# Patient Record
Sex: FEMALE | Race: BLACK OR AFRICAN AMERICAN | Employment: UNEMPLOYED | URBAN - METROPOLITAN AREA
[De-identification: names, ages, dates, MRNs, and addresses within clinical notes are randomized per-mention and may not be internally consistent; named-entity substitution may affect disease eponyms.]

---

## 2017-03-08 ENCOUNTER — HOSPITAL ENCOUNTER (OUTPATIENT)
Dept: MAMMOGRAPHY | Age: 52
Discharge: HOME | End: 2017-03-08
Attending: RADIOLOGY
Payer: COMMERCIAL

## 2017-03-08 DIAGNOSIS — Z85.3 HX: BREAST CANCER: ICD-10-CM

## 2017-03-08 PROCEDURE — 77065 DX MAMMO INCL CAD UNI: CPT

## 2017-04-17 ENCOUNTER — OFFICE VISIT (OUTPATIENT)
Dept: SURGERY | Age: 52
End: 2017-04-17

## 2017-04-17 VITALS
HEART RATE: 78 BPM | WEIGHT: 148 LBS | SYSTOLIC BLOOD PRESSURE: 100 MMHG | DIASTOLIC BLOOD PRESSURE: 66 MMHG | HEIGHT: 65 IN | RESPIRATION RATE: 18 BRPM | BODY MASS INDEX: 24.66 KG/M2

## 2017-04-17 DIAGNOSIS — C50.412 BREAST CANCER OF UPPER-OUTER QUADRANT OF LEFT FEMALE BREAST (HCC): Primary | ICD-10-CM

## 2017-04-17 DIAGNOSIS — Z98.890 S/P LUMPECTOMY OF BREAST: ICD-10-CM

## 2017-04-17 DIAGNOSIS — Z80.3 FAMILY HISTORY OF BREAST CANCER: ICD-10-CM

## 2017-04-17 DIAGNOSIS — Z92.3 S/P RADIATION THERAPY: ICD-10-CM

## 2017-04-17 NOTE — PATIENT INSTRUCTIONS
Breast Self-Exam: Care Instructions  Your Care Instructions  A breast self-exam is when you check your breasts for lumps or changes. This regular exam helps you learn how your breasts normally look and feel. Most breast problems or changes are not because of cancer. Breast self-exam is not a substitute for a mammogram. Having regular breast exams by your doctor and regular mammograms improve your chances of finding any problems with your breasts. Some women set a time each month to do a step-by-step breast self-exam. Other women like a less formal system. They might look at their breasts as they brush their teeth, or feel their breasts once in a while in the shower. If you notice a change in your breast, tell your doctor. Follow-up care is a key part of your treatment and safety. Be sure to make and go to all appointments, and call your doctor if you are having problems. Its also a good idea to know your test results and keep a list of the medicines you take. How do you do a breast self-exam?  · The best time to examine your breasts is usually one week after your menstrual period begins. Your breasts should not be tender then. If you do not have periods, you might do your exam on a day of the month that is easy to remember. · To examine your breasts:  ¨ Remove all your clothes above the waist and lie down. When you are lying down, your breast tissue spreads evenly over your chest wall, which makes it easier to feel all your breast tissue. ¨ Use the pads--not the fingertips--of the 3 middle fingers of your left hand to check your right breast. Move your fingers slowly in small coin-sized circles that overlap. ¨ Use three levels of pressure to feel of all your breast tissue. Use light pressure to feel the tissue close to the skin surface. Use medium pressure to feel a little deeper. Use firm pressure to feel your tissue close to your breastbone and ribs.  Use each pressure level to feel your breast tissue before moving on to the next spot. ¨ Check your entire breast, moving up and down as if following a strip from the collarbone to the bra line, and from the armpit to the ribs. Repeat until you have covered the entire breast.  ¨ Repeat this procedure for your left breast, using the pads of the 3 middle fingers of your right hand. · To examine your breasts while in the shower:  ¨ Place one arm over your head and lightly soap your breast on that side. ¨ Using the pads of your fingers, gently move your hand over your breast (in the strip pattern described above), feeling carefully for any lumps or changes. ¨ Repeat for the other breast.  · Have your doctor inspect anything you notice to see if you need further testing. Where can you learn more? Go to http://asa-wendy.info/. Enter P148 in the search box to learn more about \"Breast Self-Exam: Care Instructions. \"  Current as of: July 26, 2016  Content Version: 11.2  © 4734-5015 [employer]. Care instructions adapted under license by [employer] (which disclaims liability or warranty for this information). If you have questions about a medical condition or this instruction, always ask your healthcare professional. [place] [address] any warranty or liability for your use of this information.

## 2017-04-17 NOTE — PROGRESS NOTES
HISTORY OF PRESENT ILLNESS  Mathieu Horton is a 54 y.o. female. HPI  Established patient of Dr. Elissa Ennis presents today for follow-up to LEFT breast cancer. Patient reports she is doing well. No breast complaints and denies pain at this time. History of breast cancer-  LEFT breast IDC triple negative  Completed neoadjuvant chemotherapy. Followed by Dr. Juno Crump. 2/4/16- LEFT breast lumpectomy and LEFT SLNB    LEFT IDC 1.5 cm, margins clear, ER/ID negative, HER2 unamp. , 6 SLN - negative. [id number]  4/27/2016- completed radiation. Followed by Dr. Lazaro Ivan. 6/3/16 - port removed    OB History     Obstetric Comments    Menarche:  17. LMP: ? .  # of Children:  2. Age at Delivery of First Child:  24.   Hysterectomy/oophorectomy:  YES/NO. Breast Bx:  yes. Hx of Breast Feeding:  no. BCP:  yes. Hormone therapy:  no.               Past Surgical History:   Procedure Laterality Date    HX BREAST BIOPSY      HX BREAST LUMPECTOMY Left 2/4/2016    [facility] BRE[facility], [facility] [address]    HX HYSTERECTOMY  2006    CORNELIUS BIOPSY BREAST STEREOTACTIC Left 02/2016     FH includes-  Mother was diagnosed with breast cancer at age 39.    Maternal aunt was diagnosed with breast cancer (unsure what age). Maternal cousin was diagnosed with breast cancer at age 36.    Patient had genetic testing- BreastNEXT negative. 3/8/2017 - LEFT diagnostic - BIRADS 2  9/15/16 - BILATERAL diagnostic - BIRADS 2  ROS    Physical Exam   Constitutional: She is oriented to person, place, and time. She appears well-developed and well-nourished. Pulmonary/Chest: Right breast exhibits no inverted nipple, no mass, no nipple discharge, no skin change and no tenderness. Left breast exhibits skin change (well healed surgical scar with associated scar tissue). Left breast exhibits no inverted nipple, no mass, no nipple discharge and no tenderness.  Breasts are symmetrical.   Musculoskeletal: Normal range of motion. UE x 2   Lymphadenopathy:     She has no cervical adenopathy. She has no axillary adenopathy. Right: No supraclavicular adenopathy present. Left: No supraclavicular adenopathy present. Neurological: She is alert and oriented to person, place, and time. Skin: Skin is warm, dry and intact. Chest and breasts examined   Psychiatric: She has a normal mood and affect. Her speech is normal and behavior is normal.     Visit Vitals    /66    Pulse 78    Resp 18    LMP  (LMP Unknown)     ASSESSMENT and PLAN  Encounter Diagnoses   Name Primary?  Breast cancer of upper-outer quadrant of left female breast ([facility] [address] Yes    S/P lumpectomy of breast     S/P radiation therapy     Family history of breast cancer    Normal exam and imaging with no evidence of local recurrence. We discussed her diagnosis, treatment and ongoing management of her breast health with SBE, CBE and imaging. She will plan to have a BDmammogram and RTC here in 6 months. She will see Dr. Broderick Chambers in the interim. She does not have any additional follow-up with Dr. Perry Shukla at this time. She is comfortable with this plan. All questions answered and she stated understanding.

## 2017-10-05 ENCOUNTER — OFFICE VISIT (OUTPATIENT)
Dept: SURGERY | Age: 52
End: 2017-10-05

## 2017-10-05 ENCOUNTER — HOSPITAL ENCOUNTER (OUTPATIENT)
Dept: MAMMOGRAPHY | Age: 52
Discharge: HOME | End: 2017-10-05
Attending: SURGERY
Payer: COMMERCIAL

## 2017-10-05 VITALS
WEIGHT: 152 LBS | SYSTOLIC BLOOD PRESSURE: 103 MMHG | BODY MASS INDEX: 25.33 KG/M2 | HEART RATE: 66 BPM | HEIGHT: 65 IN | DIASTOLIC BLOOD PRESSURE: 68 MMHG

## 2017-10-05 DIAGNOSIS — C50.412 MALIGNANT NEOPLASM OF UPPER-OUTER QUADRANT OF LEFT BREAST IN FEMALE, ESTROGEN RECEPTOR NEGATIVE (HCC): Primary | ICD-10-CM

## 2017-10-05 DIAGNOSIS — Z98.890 S/P LUMPECTOMY OF BREAST: ICD-10-CM

## 2017-10-05 DIAGNOSIS — Z92.3 S/P RADIATION THERAPY: ICD-10-CM

## 2017-10-05 DIAGNOSIS — Z17.1 MALIGNANT NEOPLASM OF UPPER-OUTER QUADRANT OF LEFT BREAST IN FEMALE, ESTROGEN RECEPTOR NEGATIVE (HCC): Primary | ICD-10-CM

## 2017-10-05 DIAGNOSIS — C50.919 BREAST CANCER IN FEMALE (HCC): ICD-10-CM

## 2017-10-05 PROCEDURE — 77066 DX MAMMO INCL CAD BI: CPT

## 2017-10-05 NOTE — PATIENT INSTRUCTIONS
Breast Self-Exam: Care Instructions  Your Care Instructions  A breast self-exam is when you check your breasts for lumps or changes. This regular exam helps you learn how your breasts normally look and feel. Most breast problems or changes are not because of cancer. Breast self-exam is not a substitute for a mammogram. Having regular breast exams by your doctor and regular mammograms improve your chances of finding any problems with your breasts. Some women set a time each month to do a step-by-step breast self-exam. Other women like a less formal system. They might look at their breasts as they brush their teeth, or feel their breasts once in a while in the shower. If you notice a change in your breast, tell your doctor. Follow-up care is a key part of your treatment and safety. Be sure to make and go to all appointments, and call your doctor if you are having problems. Its also a good idea to know your test results and keep a list of the medicines you take. How do you do a breast self-exam?  · The best time to examine your breasts is usually one week after your menstrual period begins. Your breasts should not be tender then. If you do not have periods, you might do your exam on a day of the month that is easy to remember. · To examine your breasts:  ¨ Remove all your clothes above the waist and lie down. When you are lying down, your breast tissue spreads evenly over your chest wall, which makes it easier to feel all your breast tissue. ¨ Use the pads--not the fingertips--of the 3 middle fingers of your left hand to check your right breast. Move your fingers slowly in small coin-sized circles that overlap. ¨ Use three levels of pressure to feel of all your breast tissue. Use light pressure to feel the tissue close to the skin surface. Use medium pressure to feel a little deeper. Use firm pressure to feel your tissue close to your breastbone and ribs.  Use each pressure level to feel your breast tissue before moving on to the next spot. ¨ Check your entire breast, moving up and down as if following a strip from the collarbone to the bra line, and from the armpit to the ribs. Repeat until you have covered the entire breast.  ¨ Repeat this procedure for your left breast, using the pads of the 3 middle fingers of your right hand. · To examine your breasts while in the shower:  ¨ Place one arm over your head and lightly soap your breast on that side. ¨ Using the pads of your fingers, gently move your hand over your breast (in the strip pattern described above), feeling carefully for any lumps or changes. ¨ Repeat for the other breast.  · Have your doctor inspect anything you notice to see if you need further testing. Where can you learn more? Go to http://asa-wendy.info/. Enter P148 in the search box to learn more about \"Breast Self-Exam: Care Instructions. \"  Current as of: July 26, 2016  Content Version: 11.3  © 2286-9931 [employer]. Care instructions adapted under license by [employer] (which disclaims liability or warranty for this information). If you have questions about a medical condition or this instruction, always ask your healthcare professional. [place] [address] any warranty or liability for your use of this information.

## 2017-10-05 NOTE — PROGRESS NOTES
HISTORY OF PRESENT ILLNESS  Esdras Rowell is a 50 y.o. female. Breast Cancer     Established patient of Dr. Jackson Cousins presents today for follow-up to LEFT breast cancer. Patient reports she is doing well. No breast complaints and denies pain at this time.       History of breast cancer-  LEFT breast IDC triple negative  Completed neoadjuvant chemotherapy. Followed by Dr. Selin Hale. 2/4/16- LEFT breast lumpectomy and LEFT SLNB    LEFT IDC 1.5 cm, margins clear, ER/VT negative, HER2 unamp. , 6 SLN - negative. [id number]  4/27/2016- completed radiation. Followed by Dr. Татьяна Jauregui. 6/3/16 - port removed    OB History     Obstetric Comments    Menarche:  17. LMP: ? .  # of Children:  2. Age at Delivery of First Child:  24.   Hysterectomy/oophorectomy:  YES/NO. Breast Bx:  yes. Hx of Breast Feeding:  no. BCP:  yes. Hormone therapy:  no.               Past Surgical History:   Procedure Laterality Date    HX BREAST BIOPSY      HX BREAST LUMPECTOMY Left 2/4/2016    LEFT BREAST LUMPECTOMY, LEFT NEEDLE LOCALIZATION performed by Negrita Proctor MD [facility] [address] [facility] [place]  [facility]    HX HYSTERECTOMY  2006    CORNELIUS BIOPSY BREAST STEREOTACTIC Left 02/2016     FH includes-  Mother was diagnosed with breast cancer at age 39.    Maternal aunt was diagnosed with breast cancer (unsure what age). Maternal cousin was diagnosed with breast cancer at age 36.    Patient had genetic testing- BreastNEXT negative. Recent imaging-  Bilateral diagnostic mammogram today, 10/5/17- BIRADS 2  IMPRESSION:  1. BIRADS: 2, BENIGN. 2. No mammographic evidence for malignancy. 3. Annual bilateral diagnostic mammogram followup is advised. 4. The patient is informed. ROS    Physical Exam   Constitutional: She appears well-developed and well-nourished. Pulmonary/Chest: Right breast exhibits no inverted nipple, no mass, no nipple discharge, no skin change and no tenderness.  Left breast exhibits no inverted nipple, no mass, no nipple discharge, no skin change and no tenderness. Breasts are symmetrical.       Musculoskeletal: Normal range of motion. UE x 2   Lymphadenopathy:     She has no cervical adenopathy. She has no axillary adenopathy. Right: No supraclavicular adenopathy present. Left: No supraclavicular adenopathy present. Skin: Skin is warm, dry and intact. Chest and breasts examined   Psychiatric: She has a normal mood and affect. Her speech is normal and behavior is normal.     Visit Vitals    /68    Pulse 66    Ht 5' 5\" (1.651 m)    Wt 152 lb (68.9 kg)    LMP  (LMP Unknown)    BMI 25.29 kg/m2     ASSESSMENT and PLAN  Encounter Diagnoses   Name Primary?  Malignant neoplasm of upper-outer quadrant of left breast in female, estrogen receptor negative ([facility] [address] Yes    S/P lumpectomy of breast     S/P radiation therapy      Normal exam and imaging with no evidence of local recurrence. She has follow-up with Dr. Hetal Silver in January and will RTC here in 6 months. She will follow-up with derm if she notices changes to the right breast mole.     Anticipate BDmammogram in 10/2018

## 2017-10-05 NOTE — PROGRESS NOTES
HISTORY OF PRESENT ILLNESS  Cande Khan is a 50 y.o. female. HPI   Established patient of Dr. Dejan Harp presents today for follow-up to LEFT breast cancer. Patient reports she is doing well. No breast complaints and denies pain at this time.       History of breast cancer-  LEFT breast IDC triple negative  Completed neoadjuvant chemotherapy. Followed by Dr. Valentino Kung. 2/4/16- LEFT breast lumpectomy and LEFT SLNB    LEFT IDC 1.5 cm, margins clear, ER/OK negative, HER2 unamp. , 6 SLN - negative. [id number]  4/27/2016- completed radiation. Followed by Dr. Evelyn Barker. 6/3/16 - port removed    FH includes-  Mother was diagnosed with breast cancer at age 39.    Maternal aunt was diagnosed with breast cancer (unsure what age). Maternal cousin was diagnosed with breast cancer at age 36.    Patient had genetic testing- BreastNEXT negative. Recent imaging-  Bilateral diagnostic mammogram today, 10/5/17- BIRADS 2  IMPRESSION:  1. BIRADS: 2, BENIGN. 2. No mammographic evidence for malignancy. 3. Annual bilateral diagnostic mammogram followup is advised. 4. The patient is informed.     ROS    Physical Exam    ASSESSMENT and PLAN  {ASSESSMENT/PLAN:60968}

## 2018-04-05 ENCOUNTER — OFFICE VISIT (OUTPATIENT)
Dept: SURGERY | Age: 53
End: 2018-04-05

## 2018-04-05 VITALS
WEIGHT: 144 LBS | HEIGHT: 65 IN | BODY MASS INDEX: 23.99 KG/M2 | DIASTOLIC BLOOD PRESSURE: 80 MMHG | SYSTOLIC BLOOD PRESSURE: 111 MMHG | HEART RATE: 73 BPM

## 2018-04-05 DIAGNOSIS — Z92.3 S/P RADIATION THERAPY: ICD-10-CM

## 2018-04-05 DIAGNOSIS — C50.412 MALIGNANT NEOPLASM OF UPPER-OUTER QUADRANT OF LEFT BREAST IN FEMALE, ESTROGEN RECEPTOR NEGATIVE (HCC): Primary | ICD-10-CM

## 2018-04-05 DIAGNOSIS — Z17.1 MALIGNANT NEOPLASM OF UPPER-OUTER QUADRANT OF LEFT BREAST IN FEMALE, ESTROGEN RECEPTOR NEGATIVE (HCC): Primary | ICD-10-CM

## 2018-04-05 DIAGNOSIS — Z98.890 S/P LUMPECTOMY OF BREAST: ICD-10-CM

## 2018-04-05 RX ORDER — ERGOCALCIFEROL 1.25 MG/1
CAPSULE ORAL
Refills: 0 | COMMUNITY
Start: 2018-03-11 | End: 2019-12-30 | Stop reason: ALTCHOICE

## 2018-04-05 RX ORDER — CYCLOBENZAPRINE HCL 5 MG
TABLET ORAL
Refills: 0 | COMMUNITY
Start: 2018-01-10 | End: 2019-12-30 | Stop reason: SDUPTHER

## 2018-04-05 RX ORDER — DOCUSATE SODIUM 100 MG/1
CAPSULE, LIQUID FILLED ORAL
Refills: 3 | COMMUNITY
Start: 2018-03-29 | End: 2019-12-30

## 2018-04-05 NOTE — PATIENT INSTRUCTIONS
Breast Self-Exam: Care Instructions  Your Care Instructions    A breast self-exam is when you check your breasts for lumps or changes. This regular exam helps you learn how your breasts normally look and feel. Most breast problems or changes are not because of cancer. Breast self-exam is not a substitute for a mammogram. Having regular breast exams by your doctor and regular mammograms improve your chances of finding any problems with your breasts. Some women set a time each month to do a step-by-step breast self-exam. Other women like a less formal system. They might look at their breasts as they brush their teeth, or feel their breasts once in a while in the shower. If you notice a change in your breast, tell your doctor. Follow-up care is a key part of your treatment and safety. Be sure to make and go to all appointments, and call your doctor if you are having problems. It's also a good idea to know your test results and keep a list of the medicines you take. How do you do a breast self-exam?  · The best time to examine your breasts is usually one week after your menstrual period begins. Your breasts should not be tender then. If you do not have periods, you might do your exam on a day of the month that is easy to remember. · To examine your breasts:  ¨ Remove all your clothes above the waist and lie down. When you are lying down, your breast tissue spreads evenly over your chest wall, which makes it easier to feel all your breast tissue. ¨ Use the pads-not the fingertips-of the 3 middle fingers of your left hand to check your right breast. Move your fingers slowly in small coin-sized circles that overlap. ¨ Use three levels of pressure to feel of all your breast tissue. Use light pressure to feel the tissue close to the skin surface. Use medium pressure to feel a little deeper. Use firm pressure to feel your tissue close to your breastbone and ribs.  Use each pressure level to feel your breast tissue before moving on to the next spot. ¨ Check your entire breast, moving up and down as if following a strip from the collarbone to the bra line, and from the armpit to the ribs. Repeat until you have covered the entire breast.  ¨ Repeat this procedure for your left breast, using the pads of the 3 middle fingers of your right hand. · To examine your breasts while in the shower:  ¨ Place one arm over your head and lightly soap your breast on that side. ¨ Using the pads of your fingers, gently move your hand over your breast (in the strip pattern described above), feeling carefully for any lumps or changes. ¨ Repeat for the other breast.  · Have your doctor inspect anything you notice to see if you need further testing. Where can you learn more? Go to http://asa-wendy.info/. Enter P148 in the search box to learn more about \"Breast Self-Exam: Care Instructions. \"  Current as of: May 12, 2017  Content Version: 11.4  © 1149-8732 [employer]. Care instructions adapted under license by [employer] (which disclaims liability or warranty for this information). If you have questions about a medical condition or this instruction, always ask your healthcare professional. [place] [address] any warranty or liability for your use of this information.
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
## 2018-04-05 NOTE — MR AVS SNAPSHOT
[facility] [address] [place] [id number] Patient: Joseph Reddy MRN: [id number] :1965 Visit Information Date & Time Provider Department Dept. Phone Encounter #  
 2018  2:30 PM Ginny Woo  [address] [id number] Upcoming Health Maintenance Date Due Pneumococcal 19-64 Highest Risk (1 of 3 - PCV13) 1986 DTaP/Tdap/Td series (1 - Tdap) 1988 PAP AKA CERVICAL CYTOLOGY 1988 Influenza Age 5 to Adult 2017 FOBT Q 1 YEAR AGE 50-75 2017 BREAST CANCER SCRN MAMMOGRAM 10/5/2019 Allergies as of 2018  Review Complete On: 2018 By: Fernanda Lemos RN No Known Allergies Current Immunizations  Never Reviewed No immunizations on file. Not reviewed this visit You Were Diagnosed With   
  
 Codes Comments Malignant neoplasm of upper-outer quadrant of left breast in female, estrogen receptor negative ([facility] [address]    -  Primary ICD-10-CM: C50.412, Z17.1 ICD-9-CM: 174.4, V86.1 S/P lumpectomy of breast     ICD-10-CM: Z98.890 ICD-9-CM: V45.89 S/P radiation therapy     ICD-10-CM: Z92.3 ICD-9-CM: V66.1 Vitals BP Pulse Height(growth percentile) Weight(growth percentile) LMP BMI  
 111/80 73 5' 5\" (1.651 m) 144 lb (65.3 kg) (LMP Unknown) 23.96 kg/m2 OB Status Smoking Status Hysterectomy Never Smoker BMI and BSA Data Body Mass Index Body Surface Area  
 23.96 kg/m 2 1.73 m 2 Preferred Pharmacy Pharmacy Name Phone [address] [phone] Your Updated Medication List  
  
   
This list is accurate as of 18  2:50 PM.  Always use your most recent med list.  
  
  
  
  
 Biotin 2,500 mcg Cap Take 1 Tab by mouth daily. cyclobenzaprine 5 mg tablet Commonly known as:  FLEXERIL  
 TAKE 1 TABLET BY MOUTH 3 TIMES A DAY AS NEEDED  
  
 docusate sodium 100 mg capsule Commonly known as:  COLACE  
TAKE 1 CAPSULE BY MOUTH AS NEEDED TWICE A DAY  
  
 ergocalciferol 50,000 unit capsule Commonly known as:  ERGOCALCIFEROL  
TAKE ONCE A WEEK FOR 12 WEEKS THEN STOP  
  
 multivitamin tablet Commonly known as:  ONE A DAY Take 1 Tab by mouth daily. To-Do List   
 10/01/2018 Imaging:  CORN[facility] MAMMO BI DX INCL CAD Patient Instructions Breast Self-Exam: Care Instructions Your Care Instructions A breast self-exam is when you check your breasts for lumps or changes. This regular exam helps you learn how your breasts normally look and feel. Most breast problems or changes are not because of cancer. Breast self-exam is not a substitute for a mammogram. Having regular breast exams by your doctor and regular mammograms improve your chances of finding any problems with your breasts. Some women set a time each month to do a step-by-step breast self-exam. Other women like a less formal system. They might look at their breasts as they brush their teeth, or feel their breasts once in a while in the shower. If you notice a change in your breast, tell your doctor. Follow-up care is a key part of your treatment and safety. Be sure to make and go to all appointments, and call your doctor if you are having problems. It's also a good idea to know your test results and keep a list of the medicines you take. How do you do a breast self-exam? 
· The best time to examine your breasts is usually one week after your menstrual period begins. Your breasts should not be tender then. If you do not have periods, you might do your exam on a day of the month that is easy to remember. · To examine your breasts: ¨ Remove all your clothes above the waist and lie down.  When you are lying down, your breast tissue spreads evenly over your chest wall, which makes it easier to feel all your breast tissue. ¨ Use the pads-not the fingertips-of the 3 middle fingers of your left hand to check your right breast. Move your fingers slowly in small coin-sized circles that overlap. ¨ Use three levels of pressure to feel of all your breast tissue. Use light pressure to feel the tissue close to the skin surface. Use medium pressure to feel a little deeper. Use firm pressure to feel your tissue close to your breastbone and ribs. Use each pressure level to feel your breast tissue before moving on to the next spot. ¨ Check your entire breast, moving up and down as if following a strip from the collarbone to the bra line, and from the armpit to the ribs. Repeat until you have covered the entire breast. 
¨ Repeat this procedure for your left breast, using the pads of the 3 middle fingers of your right hand. · To examine your breasts while in the shower: 
¨ Place one arm over your head and lightly soap your breast on that side. ¨ Using the pads of your fingers, gently move your hand over your breast (in the strip pattern described above), feeling carefully for any lumps or changes. ¨ Repeat for the other breast. 
· Have your doctor inspect anything you notice to see if you need further testing. Where can you learn more? Go to http://asa-wendy.info/. Enter P148 in the search box to learn more about \"Breast Self-Exam: Care Instructions. \" Current as of: May 12, 2017 Content Version: 11.4 © 8053-1050 [employer]. Care instructions adapted under license by [employer] (which disclaims liability or warranty for this information). If you have questions about a medical condition or this instruction, always ask your healthcare professional. [place] [address] any warranty or liability for your use of this information. Introducing [facility] & HEALTH SERVICES! Dear Bobby Stock: Thank you for requesting a [facility] account. Our records indicate that you already have an active [facility] account. You can access your account anytime at [email or web address] Did you know that you can access your hospital and ER discharge instructions at any time in [facility]? You can also review all of your test results from your hospital stay or ER visit. Additional Information If you have questions, please visit the Frequently Asked Questions section of the [facility] website at [email or web address] Remember, [facility] is NOT to be used for urgent needs. For medical emergencies, dial 911. Now available from your iPhone and Android! Please provide this summary of care documentation to your next provider. Your primary care clinician is listed as Alejandra Bazan. If you have any questions after today's visit, please call [phone].

## 2018-10-04 ENCOUNTER — OFFICE VISIT (OUTPATIENT)
Dept: SURGERY | Age: 53
End: 2018-10-04

## 2018-10-04 ENCOUNTER — HOSPITAL ENCOUNTER (OUTPATIENT)
Dept: MAMMOGRAPHY | Age: 53
Discharge: HOME | End: 2018-10-04
Attending: NURSE PRACTITIONER
Payer: COMMERCIAL

## 2018-10-04 VITALS
HEART RATE: 70 BPM | BODY MASS INDEX: 23.99 KG/M2 | WEIGHT: 144 LBS | SYSTOLIC BLOOD PRESSURE: 100 MMHG | DIASTOLIC BLOOD PRESSURE: 69 MMHG | HEIGHT: 65 IN

## 2018-10-04 DIAGNOSIS — Z80.3 FAMILY HISTORY OF BREAST CANCER: ICD-10-CM

## 2018-10-04 DIAGNOSIS — Z92.3 S/P RADIATION THERAPY: ICD-10-CM

## 2018-10-04 DIAGNOSIS — Z98.890 S/P LUMPECTOMY OF BREAST: ICD-10-CM

## 2018-10-04 DIAGNOSIS — C50.412 MALIGNANT NEOPLASM OF UPPER-OUTER QUADRANT OF LEFT BREAST IN FEMALE, ESTROGEN RECEPTOR NEGATIVE (HCC): Primary | ICD-10-CM

## 2018-10-04 DIAGNOSIS — C50.412 MALIGNANT NEOPLASM OF UPPER-OUTER QUADRANT OF LEFT BREAST IN FEMALE, ESTROGEN RECEPTOR NEGATIVE (HCC): ICD-10-CM

## 2018-10-04 DIAGNOSIS — Z17.1 MALIGNANT NEOPLASM OF UPPER-OUTER QUADRANT OF LEFT BREAST IN FEMALE, ESTROGEN RECEPTOR NEGATIVE (HCC): ICD-10-CM

## 2018-10-04 DIAGNOSIS — Z17.1 MALIGNANT NEOPLASM OF UPPER-OUTER QUADRANT OF LEFT BREAST IN FEMALE, ESTROGEN RECEPTOR NEGATIVE (HCC): Primary | ICD-10-CM

## 2018-10-04 PROCEDURE — 77062 BREAST TOMOSYNTHESIS BI: CPT

## 2018-10-04 PROCEDURE — 77066 DX MAMMO INCL CAD BI: CPT

## 2018-10-04 NOTE — MR AVS SNAPSHOT
Landy Walters [address] 
[phone] Patient: Kee Vaz MRN: [id number] :1965 Visit Information Date & Time Provider Department Dept. Phone Encounter #  
 10/4/2018  2:30 PM Yemi Hilliard  [address] [id number] Your Appointments 10/3/2019  2:00 PM  
Follow Up with YOU Sanchez 22 ([facility]) Appt Note: 1yr follow up/cp?/aac  
 [address] [phone]  
[address] [facility] Upcoming Health Maintenance Date Due Pneumococcal 19-64 Highest Risk (1 of 3 - PCV13) 1986 DTaP/Tdap/Td series (1 - Tdap) 1988 PAP AKA CERVICAL CYTOLOGY 1988 Shingrix Vaccine Age 50> (1 of 2) 2017 FOBT Q 1 YEAR AGE 50-75 2017 Influenza Age 5 to Adult 2018 BREAST CANCER SCRN MAMMOGRAM 10/5/2019 Allergies as of 10/4/2018  Review Complete On: 10/4/2018 By: Yemi Hilliard NP No Known Allergies Current Immunizations  Never Reviewed No immunizations on file. Not reviewed this visit You Were Diagnosed With   
  
 Codes Comments Malignant neoplasm of upper-outer quadrant of left breast in female, estrogen receptor negative ([facility] [address]    -  Primary ICD-10-CM: C50.412, Z17.1 ICD-9-CM: 174.4, V86.1 S/P lumpectomy of breast     ICD-10-CM: Z98.890 ICD-9-CM: V45.89 S/P radiation therapy     ICD-10-CM: Z92.3 ICD-9-CM: V66.1 Family history of breast cancer     ICD-10-CM: Z80.3 ICD-9-CM: V16.3 Vitals BP Pulse Height(growth percentile) Weight(growth percentile) LMP BMI  
 100/69 70 5' 5\" (1.651 m) 144 lb (65.3 kg) (LMP Unknown) 23.96 kg/m2 OB Status Smoking Status Hysterectomy Never Smoker BMI and BSA Data Body Mass Index Body Surface Area  
 23.96 kg/m 2 1.73 m 2 Preferred Pharmacy Pharmacy Name Phone [address] [place] [address] [phone] Your Updated Medication List  
  
   
This list is accurate as of 10/4/18  3:32 PM.  Always use your most recent med list.  
  
  
  
  
 Biotin 2,500 mcg Cap Take 1 Tab by mouth daily. cyclobenzaprine 5 mg tablet Commonly known as:  FLEXERIL  
TAKE 1 TABLET BY MOUTH 3 TIMES A DAY AS NEEDED  
  
 docusate sodium 100 mg capsule Commonly known as:  COLACE  
TAKE 1 CAPSULE BY MOUTH AS NEEDED TWICE A DAY  
  
 ergocalciferol 50,000 unit capsule Commonly known as:  ERGOCALCIFEROL  
TAKE ONCE A WEEK FOR 12 WEEKS THEN STOP  
  
 multivitamin tablet Commonly known as:  ONE A DAY Take 1 Tab by mouth daily. To-Do List   
 10/01/2019 Imaging:  [facility] 3D JOSE CRUZ W MAMMO BI DX INCL CAD Patient Instructions Breast Cancer: Care Instructions Your Care Instructions Breast cancer occurs when abnormal cells grow out of control in the breast. These cancer cells can spread within the breast, to nearby lymph nodes and other tissues, and to other parts of the body. Being treated for cancer can weaken your body, and you may feel very tired. Get the rest your body needs so you can feel better. Finding out that you have cancer is scary. You may feel many emotions and may need some help coping. Seek out family, friends, and counselors for support. You also can do things at home to make yourself feel better while you go through treatment. Call the [employer] (1-569.307.2015) or visit its website at 3[address] [employer]Company. [employer] [place] for more information. Follow-up care is a key part of your treatment and safety. Be sure to make and go to all appointments, and call your doctor if you are having problems. It's also a good idea to know your test results and keep a list of the medicines you take. How can you care for yourself at home? · Take your medicines exactly as prescribed. Call your doctor if you think you are having a problem with your medicine. You may get medicine for nausea and vomiting if you have these side effects. · Follow your doctor's instructions to relieve pain. Pain from cancer and surgery can almost always be controlled. Use pain medicine when you first notice pain, before it becomes severe. · Eat healthy food. If you do not feel like eating, try to eat food that has protein and extra calories to keep up your strength and prevent weight loss. Drink liquid meal replacements for extra calories and protein. Try to eat your main meal early. · Get some physical activity every day, but do not get too tired. Keep doing the hobbies you enjoy as your energy allows. · Do not smoke. Smoking can make your cancer worse. If you need help quitting, talk to your doctor about stop-smoking programs and medicines. These can increase your chances of quitting for good. · Take steps to control your stress and workload. Learn relaxation techniques. ¨ Share your feelings. Stress and tension affect our emotions. By expressing your feelings to others, you may be able to understand and cope with them. ¨ Consider joining a support group. Talking about a problem with your spouse, a good friend, or other people with similar problems is a good way to reduce tension and stress. ¨ Express yourself through art. Try writing, crafts, dance, or art to relieve stress. Some dance, writing, or art groups may be available just for people who have cancer. ¨ Be kind to your body and mind. Getting enough sleep, eating a healthy diet, and taking time to do things you enjoy can contribute to an overall feeling of balance in your life and can help reduce stress. ¨ Get help if you need it. Discuss your concerns with your doctor or counselor. · If you are vomiting or have diarrhea: ¨ Drink plenty of fluids (enough so that your urine is light yellow or clear like water) to prevent dehydration. Choose water and other caffeine-free clear liquids. If you have kidney, heart, or liver disease and have to limit fluids, talk with your doctor before you increase the amount of fluids you drink. ¨ When you are able to eat, try clear soups, mild foods, and liquids until all symptoms are gone for 12 to 48 hours. Other good choices include dry toast, crackers, cooked cereal, and gelatin dessert, such as Jell-O. · If you have not already done so, prepare a list of advance directives. Advance directives are instructions to your doctor and family members about what kind of care you want if you become unable to speak or express yourself. When should you call for help? Call 911 anytime you think you may need emergency care. For example, call if: 
  · You passed out (lost consciousness).  
 Call your doctor now or seek immediate medical care if: 
  · You have a fever.  
  · You have abnormal bleeding.  
  · You think you have an infection.  
  · You have new or worse pain.  
  · You have new symptoms, such as a cough, belly pain, vomiting, diarrhea, or a rash.  
 Watch closely for changes in your health, and be sure to contact your doctor if: 
  · You are much more tired than usual.  
  · You have swollen glands in your armpits, groin, or neck.  
  · You do not get better as expected. Where can you learn more? Go to http://asa-wendy.info/. Enter V321 in the search box to learn more about \"Breast Cancer: Care Instructions. \" Current as of: March 28, 2018 Content Version: 11.8 © 5024-6485 [employer]FOX. Care instructions adapted under license by [employer] (which disclaims liability or warranty for this information).  If you have questions about a medical condition or this instruction, always ask your healthcare professional. [employer] disclaims any warranty or liability for your use of this information. Introducing [facility] & HEALTH SERVICES! Dear Aziza Stover: Thank you for requesting a [employer] account. Our records indicate that you already have an active [employer] account. You can access your account anytime at [email or web address] Did you know that you can access your hospital and ER discharge instructions at any time in [employer]? You can also review all of your test results from your hospital stay or ER visit. Additional Information If you have questions, please visit the Frequently Asked Questions section of the [employer] website at [email or web address] Remember, [employer] is NOT to be used for urgent needs. For medical emergencies, dial 911. Now available from your iPhone and Android! Please provide this summary of care documentation to your next provider. Your primary care clinician is listed as Brandon Lucas. If you have any questions after today's visit, please call [phone].

## 2018-10-04 NOTE — PATIENT INSTRUCTIONS
Breast Cancer: Care Instructions  Your Care Instructions    Breast cancer occurs when abnormal cells grow out of control in the breast. These cancer cells can spread within the breast, to nearby lymph nodes and other tissues, and to other parts of the body. Being treated for cancer can weaken your body, and you may feel very tired. Get the rest your body needs so you can feel better. Finding out that you have cancer is scary. You may feel many emotions and may need some help coping. Seek out family, friends, and counselors for support. You also can do things at home to make yourself feel better while you go through treatment. Call the [employer] (4-855.137.3349) or visit its website at [employer] for more information. Follow-up care is a key part of your treatment and safety. Be sure to make and go to all appointments, and call your doctor if you are having problems. It's also a good idea to know your test results and keep a list of the medicines you take. How can you care for yourself at home? · Take your medicines exactly as prescribed. Call your doctor if you think you are having a problem with your medicine. You may get medicine for nausea and vomiting if you have these side effects. · Follow your doctor's instructions to relieve pain. Pain from cancer and surgery can almost always be controlled. Use pain medicine when you first notice pain, before it becomes severe. · Eat healthy food. If you do not feel like eating, try to eat food that has protein and extra calories to keep up your strength and prevent weight loss. Drink liquid meal replacements for extra calories and protein. Try to eat your main meal early. · Get some physical activity every day, but do not get too tired. Keep doing the hobbies you enjoy as your energy allows. · Do not smoke. Smoking can make your cancer worse. If you need help quitting, talk to your doctor about stop-smoking programs and medicines.  These can increase your chances of quitting for good. · Take steps to control your stress and workload. Learn relaxation techniques. ¨ Share your feelings. Stress and tension affect our emotions. By expressing your feelings to others, you may be able to understand and cope with them. ¨ Consider joining a support group. Talking about a problem with your spouse, a good friend, or other people with similar problems is a good way to reduce tension and stress. ¨ Express yourself through art. Try writing, crafts, dance, or art to relieve stress. Some dance, writing, or art groups may be available just for people who have cancer. ¨ Be kind to your body and mind. Getting enough sleep, eating a healthy diet, and taking time to do things you enjoy can contribute to an overall feeling of balance in your life and can help reduce stress. ¨ Get help if you need it. Discuss your concerns with your doctor or counselor. · If you are vomiting or have diarrhea:  ¨ Drink plenty of fluids (enough so that your urine is light yellow or clear like water) to prevent dehydration. Choose water and other caffeine-free clear liquids. If you have kidney, heart, or liver disease and have to limit fluids, talk with your doctor before you increase the amount of fluids you drink. ¨ When you are able to eat, try clear soups, mild foods, and liquids until all symptoms are gone for 12 to 48 hours. Other good choices include dry toast, crackers, cooked cereal, and gelatin dessert, such as Jell-O.  · If you have not already done so, prepare a list of advance directives. Advance directives are instructions to your doctor and family members about what kind of care you want if you become unable to speak or express yourself. When should you call for help? Call 911 anytime you think you may need emergency care.  For example, call if:    · You passed out (lost consciousness).    Call your doctor now or seek immediate medical care if:    · You have a fever.     · You have abnormal bleeding.     · You think you have an infection.     · You have new or worse pain.     · You have new symptoms, such as a cough, belly pain, vomiting, diarrhea, or a rash.    Watch closely for changes in your health, and be sure to contact your doctor if:    · You are much more tired than usual.     · You have swollen glands in your armpits, groin, or neck.     · You do not get better as expected. Where can you learn more? Go to http://asa-wendy.info/. Enter V321 in the search box to learn more about \"Breast Cancer: Care Instructions. \"  Current as of: March 28, 2018  Content Version: 11.8  © 5398-1571 PerfectSearch. Care instructions adapted under license by [employer] (which disclaims liability or warranty for this information). If you have questions about a medical condition or this instruction, always ask your healthcare professional. [address] any warranty or liability for your use of this information.

## 2018-10-04 NOTE — PROGRESS NOTES
HISTORY OF PRESENT ILLNESS  Van Garcia is a 48 y.o. female. HPI ESTABLISHED patient here for follow up LEFT breast cancer. The patient is S/P LEFT breast lumpectomy for triple negative breast cancer. She is feeling well and has no breast problems at this time. The patient had her annual mammogram done today. History of breast cancer-  LEFT breast IDC triple negative  Completed neoadjuvant chemotherapy. Followed by Dr. Micah Henson. 2/4/16- LEFT breast lumpectomy and LEFT SLNB    LEFT IDC 1.5 cm, margins clear, ER/DC negative, HER2 unamp. , 6 SLN - negative. [id number]  4/27/2016- completed radiation. Followed by Dr. Shirley Wilkins. 6/3/16 - port removed    OB History     Obstetric Comments    Menarche:  17. LMP: ? .  # of Children:  2. Age at Delivery of First Child:  24.   Hysterectomy/oophorectomy:  YES/NO. Breast Bx:  yes. Hx of Breast Feeding:  no. BCP:  yes. Hormone therapy:  no.               Past Surgical History:   Procedure Laterality Date    HX BREAST BIOPSY      HX BREAST LUMPECTOMY Left 2/4/2016    LEFT BREAST LUMPECTOMY, LEFT NEEDLE LOCALIZATION performed by Moi Acosta MD at [address] [facility]  2014 & 2000    HX HYSTERECTOMY  2006    CORNELIUS BIOPSY BREAST STEREOTACTIC Left 02/2016     FH includes-  Mother was diagnosed with breast cancer at age 39.    Maternal aunt was diagnosed with breast cancer (unsure what age). Maternal cousin was diagnosed with breast cancer at age 36.    Patient had genetic testing- BreastNEXT negative. 3D mammogram bilateral diagnostic 10/4/2018  FINDINGS: There are stable and benign appearance to post lobectomy changes in  the left breast with Biosorb device. There is no mass, suspicious calcification,  distortion, skin thickening, or nipple retraction. No significant change from  the prior study. IMPRESSION:  No mammographic evidence of malignancy. BI-RADS Assessment  Category 2: Benign finding.       ROS    Physical Exam   Constitutional: She appears well-developed and well-nourished. Pulmonary/Chest: Right breast exhibits no inverted nipple, no mass, no nipple discharge, no skin change and no tenderness. Left breast exhibits no inverted nipple, no mass, no nipple discharge, no skin change (well healed surgical incision to breast and axilla) and no tenderness. Breasts are symmetrical.   Musculoskeletal: Normal range of motion. UE x 2   Lymphadenopathy:     She has no axillary adenopathy. Right: No supraclavicular adenopathy present. Left: No supraclavicular adenopathy present. Skin: Skin is warm, dry and intact. Chest and breasts examined  Left breast skin thickening s/p XRT   Psychiatric: She has a normal mood and affect. Her speech is normal and behavior is normal.     Visit Vitals    /69    Pulse 70    Ht 5' 5\" (1.651 m)    Wt 144 lb (65.3 kg)    LMP  (LMP Unknown)    BMI 23.96 kg/m2     ASSESSMENT and PLAN  Encounter Diagnoses   Name Primary?  Malignant neoplasm of upper-outer quadrant of left breast in female, estrogen receptor negative ([facility] [address] Yes    S/P lumpectomy of breast     S/P radiation therapy     Family history of breast cancer      Normal exam and imaging with no evidence of local recurrence. Continues to see Dr. Duy Benjamin. BDmammogram 3D and RTC here in 1 year or sooner PRN. She is comfortable with this plan. All questions answered and she stated understanding.

## 2019-10-08 ENCOUNTER — HOSPITAL ENCOUNTER (OUTPATIENT)
Dept: MAMMOGRAPHY | Age: 54
Discharge: HOME | End: 2019-10-08
Attending: NURSE PRACTITIONER
Payer: COMMERCIAL

## 2019-10-08 ENCOUNTER — TELEPHONE (OUTPATIENT)
Dept: SURGERY | Age: 54
End: 2019-10-08

## 2019-10-08 DIAGNOSIS — Z92.3 S/P RADIATION THERAPY: ICD-10-CM

## 2019-10-08 DIAGNOSIS — C50.412 MALIGNANT NEOPLASM OF UPPER-OUTER QUADRANT OF LEFT BREAST IN FEMALE, ESTROGEN RECEPTOR NEGATIVE (HCC): ICD-10-CM

## 2019-10-08 DIAGNOSIS — Z80.3 FAMILY HISTORY OF BREAST CANCER: ICD-10-CM

## 2019-10-08 DIAGNOSIS — Z98.890 S/P LUMPECTOMY OF BREAST: ICD-10-CM

## 2019-10-08 DIAGNOSIS — Z17.1 MALIGNANT NEOPLASM OF UPPER-OUTER QUADRANT OF LEFT BREAST IN FEMALE, ESTROGEN RECEPTOR NEGATIVE (HCC): ICD-10-CM

## 2019-10-08 PROCEDURE — 77062 BREAST TOMOSYNTHESIS BI: CPT

## 2019-10-08 PROCEDURE — 76642 ULTRASOUND BREAST LIMITED: CPT

## 2019-10-08 NOTE — TELEPHONE ENCOUNTER
Patient called and was scheduled to see Dre Breen NP for office visit, but today's imaging results recommended LEFT breast biopsy. Confirmed with with Chung Garcia and patient should reschedule and have appointment with Dr. Yoav Alberto for this. Spoke with patient and let her know PSR will reach out to her this afternoon to schedule appointment for biopsy. She was appreciative of the call.

## 2019-10-21 ENCOUNTER — OFFICE VISIT (OUTPATIENT)
Dept: SURGERY | Age: 54
End: 2019-10-21

## 2019-10-21 ENCOUNTER — HOSPITAL ENCOUNTER (OUTPATIENT)
Dept: LAB | Age: 54
Discharge: HOME | End: 2019-10-21

## 2019-10-21 VITALS
WEIGHT: 151.4 LBS | HEIGHT: 65 IN | DIASTOLIC BLOOD PRESSURE: 62 MMHG | BODY MASS INDEX: 25.22 KG/M2 | HEART RATE: 67 BPM | SYSTOLIC BLOOD PRESSURE: 104 MMHG

## 2019-10-21 DIAGNOSIS — Z98.890 S/P LUMPECTOMY, LEFT BREAST: ICD-10-CM

## 2019-10-21 DIAGNOSIS — C50.912 MALIGNANT NEOPLASM OF LEFT FEMALE BREAST, UNSPECIFIED ESTROGEN RECEPTOR STATUS, UNSPECIFIED SITE OF BREAST (HCC): Primary | ICD-10-CM

## 2019-10-21 DIAGNOSIS — R92.8 ABNORMAL FINDING ON BREAST IMAGING: ICD-10-CM

## 2019-10-21 RX ORDER — CHOLECALCIFEROL TAB 125 MCG (5000 UNIT) 125 MCG
10000 TAB ORAL DAILY
COMMUNITY
End: 2019-12-30

## 2019-10-21 NOTE — PROGRESS NOTES
HISTORY OF PRESENT ILLNESS  Alfredo Elliott is a 48 y.o. female. HPI ESTABLISHED patient with history of LEFT breast cancer, here because of recent abnormal imaging on the LEFT side. She does not feel any particular lump since she says she just feels scar tissue in the area of the lumpectomy. Denies any nipple drainage/inversion or skin changes. History of breast cancer-  LEFT breast IDC triple negative  Completed neoadjuvant chemotherapy. Followed by Dr. Racheal Bui. 2/4/16- LEFT breast lumpectomy and LEFT SLNB    LEFT IDC 1.5 cm, margins clear, ER/LA negative, HER2 unamp. , 6 SLN - negative. [id number]  4/27/2016- completed radiation. Followed by Dr. Adri Baugh. 6/3/16 - port removed        FH includes-  Mother was diagnosed with breast cancer at age 39.    Maternal aunt was diagnosed with breast cancer (unsure what age). Maternal cousin was diagnosed with breast cancer at age 36.    Patient had genetic testing- BreastNEXT negative. [facility] Results (most recent):  Results from Hospital Encounter encounter on 10/08/19   [facility] 3D JOSE CRUZ W MAMMO BI DX INCL CAD    Narrative INDICATION: Left lumpectomy. COMPARISON: Mammogram(s), most recent, 2018. [place] COMPOSITION: There are scattered fibroglandular densities. .   TECHNIQUE: Standard 2D, CC and MLO views of each breast were performed with  digital technique and subjected to computer-aided detection. Tomosynthesis of  each breast was performed in CC and MLO projections. Magnification views of the  lumpectomy site were performed. [place] FINDINGS:   Adjacent to the lumpectomy, there is a new rounded density. No suspicious masses  or microcalcifications are seen on the right. Real-time ultrasonography 12:00  demonstrates a hypoechoic 8.9 x 7.9 x 7.2 mm mass adjacent to the lumpectomy  site. .      Impression IMPRESSION:  1. BI-RADS category 4A, suspicious. 2. Biopsy of the mass within the left breast is recommended. This may represent  an area of fat necrosis.   3. She was informed. She will followup with [facility]. US Results (most recent):  Results from [place] encounter on 10/08/19   US BREAST LT LIMITED=<3 QUAD    Narrative INDICATION: Left lumpectomy. COMPARISON: Mammogram(s), most recent, 2018. Federico Kj COMPOSITION: There are scattered fibroglandular densities. .   TECHNIQUE: Standard 2D, CC and MLO views of each breast were performed with  digital technique and subjected to computer-aided detection. Tomosynthesis of  each breast was performed in CC and MLO projections. Magnification views of the  lumpectomy site were performed. Federico Underwood FINDINGS:   Adjacent to the lumpectomy, there is a new rounded density. No suspicious masses  or microcalcifications are seen on the right. Real-time ultrasonography 12:00  demonstrates a hypoechoic 8.9 x 7.9 x 7.2 mm mass adjacent to the lumpectomy  site. .      Impression IMPRESSION:  1. BI-RADS category 4A, suspicious. 2. Biopsy of the mass within the left breast is recommended. This may represent  an area of fat necrosis. 3. She was informed. She will followup with Mercy Johnson. Review of Systems   All other systems reviewed and are negative. Physical Exam   Pulmonary/Chest: Left breast exhibits mass. Small subcentimeter mass left breast lumpectomy scar   Nursing note and vitals reviewed. US - Guided Core Biopsy  Indication : Palpable mass Bilateral  breast lumpectomy 1:00    Ultrasound Findings: 8 mm round hypoechoic mass adjacent to scar   Prep : alcohol. Anesthesia : 1% lidocaine with epinephrine, 10 cc. Device : The bard marquee device was advanced through the lesion and captured tissue with real-time Ultrasound Confirmation. Core Sampling :  3 cores were obtained. Marker: hydromark   Dressing : Steristrips, gauze and tape. Instructions : Remove gauze this evening. Remove steristrips in one week.      [address] [facility] MAIN OFFICE [address]  OFFICE PROCEDURE PROGRESS NOTE        Chart reviewed for the following:   Luis Rojas MD, have reviewed the History, Physical and updated the Allergic reactions for [address] performed immediately prior to start of procedure:   Lusi Rojas MD, have performed the following reviews on [address] prior to the start of the procedure:            * Patient was identified by name and date of birth   * Agreement on procedure being performed was verified  * Risks and Benefits explained to the patient  * Procedure site verified and marked as necessary  * Patient was positioned for comfort  * Consent was signed and verified     Time: 10:20       Date of procedure: 10/22/2019    Procedure performed by:  Fransico Beck MD    Provider assisted by: baldo LEMA    Patient assisted by: self    How tolerated by patient: tolerated the procedure well with no complications    Post Procedural Pain Scale: 0 - No Hurt    Comments: none          ASSESSMENT and PLAN    ICD-10-CM ICD-9-CM    1. Malignant neoplasm of left female breast, unspecified estrogen receptor status, unspecified site of breast ([facility] C50.912 174.9 SURGICAL PATHOLOGY   2. Abnormal finding on breast imaging R92.8 793.89 SURGICAL PATHOLOGY   3. S/P lumpectomy, left breast Z98.890 V45.89 SURGICAL PATHOLOGY     - new mass at lumpectomy s/p core biopsy. - also schedule breast mri. Will call with results.

## 2019-10-21 NOTE — PATIENT INSTRUCTIONS
Needle Breast Biopsy: About This Test  What is it? A breast biopsy removes a sample of breast tissue that is looked at under a microscope to check for breast cancer. For a needle breast biopsy, your doctor uses a needle to take a small sample of fluid or cells from the breast for testing. Why is this test done? A breast biopsy is usually done to check a lump found during a breast exam or a suspicious area found on a mammogram or other imaging. If there is a good chance that your doctor can get a sample without doing an open (surgical) biopsy, you can have a needle biopsy. You may have a choice of what kind of biopsy you prefer. How can you prepare for the test?  Talk to your doctor about all your health conditions before the test. For example, tell your doctor if you:  · Are taking any medicines. · Are allergic to any medicines. · Have had bleeding problems, or if you take aspirin or some other blood thinner. · Are or might be pregnant. What happens before the test?  · You will take off your clothing above the waist. A paper or cloth gown will cover your shoulders. · You will sit or lie on an examination table. Your hands may be at your sides or raised above your head (whichever position makes it easiest to find the lump or suspicious area). · Your skin is washed with a special soap. · You may get a shot of medicine to numb the biopsy area on your breast.  What happens during the test?  · For a fine-needle aspiration biopsy, your doctor inserts a thin needle into the lump or suspicious area. Then he or she removes a sample of cells or fluid. · For a core needle biopsy:  ? A small cut is made in your skin. ? Your doctor inserts a needle with a special tip. Then he or she removes a sample of breast tissue about the size of a grain of rice. ? About 3 to 12 samples are needed to get the most accurate results.   After either type of biopsy, the needle is removed and pressure is put on the needle site to stop any bleeding. The area is covered with a bandage. What else should you know about the test?  · You will feel only a quick sting from the needle if you have a local anesthetic to numb the biopsy area. You may feel some pressure when the biopsy needle is put in. · For a core needle biopsy, the small cut for the needle does not usually need stitches. How long does the test take? · A fine-needle aspiration biopsy will take about 5 to 15 minutes. · A core needle biopsy will take about 15 minutes. What happens after the test?  · You'll be told how long it may take to get your results back. · You will probably be able to go home right away. · You can go back to your usual activities right away, but avoid heavy lifting for 24 hours. · The site may be tender for 2 to 3 days. You may also have some bruising, swelling, or slight bleeding. ? You can use an ice pack. Put ice or a cold pack on the area for 10 to 20 minutes at a time. Put a thin cloth between the ice and your skin. ? Ask your doctor if you can take an over-the-counter pain medicine, such as acetaminophen (Tylenol), ibuprofen (Advil, Motrin), or naproxen (Aleve). Be safe with medicines. Read and follow all instructions on the label. · After a specialist looks at the biopsy sample for signs of cancer, your doctor's office will let you know the results. · If the test results are not clear, you may have another biopsy or test.  Follow-up care is a key part of your treatment and safety. Be sure to make and go to all appointments, and call your doctor if you are having problems. It's also a good idea to keep a list of the medicines you take. Ask your doctor when you can expect to have your test results. Where can you learn more? Go to http://asa-wendy.info/.   Enter F074 in the search box to learn more about \"Needle Breast Biopsy: About This Test.\"  Current as of: December 19, 2018  Content Version: 12.2  © 8625-7764 [employer]. Care instructions adapted under license by [employer] (which disclaims liability or warranty for this information). If you have questions about a medical condition or this instruction, always ask your healthcare professional. [address] any warranty or liability for your use of this information.

## 2019-12-30 ENCOUNTER — OFFICE VISIT (OUTPATIENT)
Dept: INTERNAL MEDICINE CLINIC | Age: 54
End: 2019-12-30

## 2019-12-30 VITALS
WEIGHT: 154 LBS | HEIGHT: 65 IN | TEMPERATURE: 97.8 F | HEART RATE: 79 BPM | BODY MASS INDEX: 25.66 KG/M2 | RESPIRATION RATE: 14 BRPM | OXYGEN SATURATION: 98 % | SYSTOLIC BLOOD PRESSURE: 96 MMHG | DIASTOLIC BLOOD PRESSURE: 70 MMHG

## 2019-12-30 DIAGNOSIS — Z76.89 ESTABLISHING CARE WITH NEW DOCTOR, ENCOUNTER FOR: ICD-10-CM

## 2019-12-30 DIAGNOSIS — G43.909 MIGRAINE WITHOUT STATUS MIGRAINOSUS, NOT INTRACTABLE, UNSPECIFIED MIGRAINE TYPE: ICD-10-CM

## 2019-12-30 DIAGNOSIS — M25.511 CHRONIC PAIN OF BOTH SHOULDERS: Primary | ICD-10-CM

## 2019-12-30 DIAGNOSIS — Z85.3 HISTORY OF BREAST CANCER: ICD-10-CM

## 2019-12-30 DIAGNOSIS — E55.9 VITAMIN D DEFICIENCY: ICD-10-CM

## 2019-12-30 DIAGNOSIS — M25.512 CHRONIC PAIN OF BOTH SHOULDERS: Primary | ICD-10-CM

## 2019-12-30 DIAGNOSIS — G89.29 CHRONIC PAIN OF BOTH SHOULDERS: Primary | ICD-10-CM

## 2019-12-30 LAB
BILIRUB UR QL STRIP: NEGATIVE
CHOLEST SERPL-MCNC: 281 MG/DL
GLUCOSE POC: 89 MG/DL
GLUCOSE UR-MCNC: NEGATIVE MG/DL
HBA1C MFR BLD HPLC: 5.1 %
HDLC SERPL-MCNC: 90 MG/DL
KETONES P FAST UR STRIP-MCNC: NEGATIVE MG/DL
LDL CHOLESTEROL POC: 174 MG/DL
NON-HDL CHOLESTEROL, 011976: 192
PH UR STRIP: 5.5 [PH] (ref 4.6–8)
PROT UR QL STRIP: NEGATIVE
SP GR UR STRIP: 1.02 (ref 1–1.03)
TCHOL/HDL RATIO (POC): 3.2
TRIGL SERPL-MCNC: 90 MG/DL
UA UROBILINOGEN AMB POC: NORMAL (ref 0.2–1)
URINALYSIS CLARITY POC: CLEAR
URINALYSIS COLOR POC: YELLOW
URINE BLOOD POC: NORMAL
URINE LEUKOCYTES POC: NEGATIVE
URINE NITRITES POC: NEGATIVE

## 2019-12-30 RX ORDER — NAPROXEN AND ESOMEPRAZOLE MAGNESIUM 500; 20 MG/1; MG/1
500 TABLET, DELAYED RELEASE ORAL
COMMUNITY
Start: 2019-06-28

## 2019-12-30 RX ORDER — CYCLOBENZAPRINE HCL 5 MG
TABLET ORAL
Qty: 90 TAB | Refills: 1 | Status: SHIPPED | OUTPATIENT
Start: 2019-12-30 | End: 2020-03-13

## 2019-12-30 RX ORDER — BUTALBITAL, ACETAMINOPHEN AND CAFFEINE 300; 40; 50 MG/1; MG/1; MG/1
1 CAPSULE ORAL 2 TIMES DAILY
Qty: 30 CAP | Refills: 1 | Status: SHIPPED | OUTPATIENT
Start: 2019-12-30

## 2019-12-30 RX ORDER — BUTALBITAL, ACETAMINOPHEN AND CAFFEINE 300; 40; 50 MG/1; MG/1; MG/1
CAPSULE ORAL
COMMUNITY
Start: 2016-09-12 | End: 2019-12-30 | Stop reason: DRUGHIGH

## 2019-12-30 RX ORDER — MELATONIN
2000 DAILY
Qty: 180 TAB | Refills: 1 | Status: SHIPPED | OUTPATIENT
Start: 2019-12-30

## 2019-12-30 RX ORDER — RIBOFLAVIN (VITAMIN B2) 100 MG
300 TABLET ORAL DAILY
Qty: 90 TAB | Refills: 3 | Status: SHIPPED | OUTPATIENT
Start: 2019-12-30

## 2019-12-30 NOTE — PROGRESS NOTES
CC: Establish Care    HPI:     Elva Link is a 48 y.o. female who presents with a chief complaint of Establish Care   and with other medical problems:    MIGRAINES  - Migraines since her thirties  - taking Fioricet if catches it early, sometimes stronger meds    CHRONIC PAIN  - began 5 yrs ago: Bilateral shoulder pain  - s/p cortisone shots in both shoulders about yr ago  - now helped w/ muscle relaxant  - Pain in left medial knee    H/O BREAST CANCER - SHARITA  - stg 2b triple neg per pt  - mom w/ breast cancer and mat aunt, mat cousin  - dx 2015, chemo 2015; surg/rad 2016, s/p lumpectomy    VITAMIN D DEFICIENCY  - dx'd Jan 2019  - followed by oncology    S/P HYSTERECTOMY  - for menses cxs: bleeding, pain in her early forties      No Known Allergies   Current Outpatient Medications on File Prior to Visit   Medication Sig Dispense Refill    cholecalciferol, VITAMIN D3, (VITAMIN D3) 5,000 unit tab tablet Take 10,000 Units by mouth daily.  cyclobenzaprine (FLEXERIL) 5 mg tablet TAKE 1 TABLET BY MOUTH 3 TIMES A DAY AS NEEDED  0    Biotin 2,500 mcg cap Take 1 Tab by mouth daily.  docusate sodium (COLACE) 100 mg capsule TAKE 1 CAPSULE BY MOUTH AS NEEDED TWICE A DAY  3    ergocalciferol (ERGOCALCIFEROL) 50,000 unit capsule TAKE ONCE A WEEK FOR 12 WEEKS THEN STOP  0    multivitamin (ONE A DAY) tablet Take 1 Tab by mouth daily. No current facility-administered medications on file prior to visit. ASSESSMENT  TREATMENT  PLAN:    1. Chronic pain of both shoulders  Improved w/ muscle relaxant  - cyclobenzaprine (FLEXERIL) 5 mg tablet; TAKE 1 TABLET BY MOUTH 3 TIMES A DAY AS NEEDED  Indications: shoulder pain  Dispense: 90 Tab; Refill: 1    2. Migraine without status migrainosus, not intractable, unspecified migraine type  Stable  - riboflavin, vitamin B2, 100 mg tablet; Take 3 Tabs by mouth daily. Indications: migraine headache  Dispense: 90 Tab;  Refill: 3  - butalbital-acetaminophen-caff (FIORICET) -40 mg per capsule; Take 1 Cap by mouth two (2) times a day. Dispense: 30 Cap; Refill: 1    3. History of breast cancer  Followed by Oncology    4. Vitamin D deficiency  Followed by Oncology; contin:  - cholecalciferol (VITAMIN D3) (1000 Units /25 mcg) tablet; Take 2 Tabs by mouth daily. Indications: low vitamin D levels  Dispense: 180 Tab; Refill: 1    5. Establishing care with new doctor, encounter for  - AMB POC LIPID PROFILE  - AMB POC GLUCOSE BLOOD, BY GLUCOSE MONITORING DEVICE  - AMB POC URINALYSIS DIP STICK MANUAL W/O MICRO  - AMB POC HEMOGLOBIN A1C  - CBC W/O DIFF  - METABOLIC PANEL, COMPREHENSIVE    There are no Patient Instructions on file for this visit. EXAM:    CONSTITUTIONAL:     Vitals:    12/30/19 0924   BP: 96/70   Pulse: 79   Resp: 14   Temp: 97.8 °F (36.6 °C)   TempSrc: Oral   SpO2: 98%   Weight: 154 lb (69.9 kg)   Height: 5' 5\" (1.651 m)   :3  Weight Metrics 12/30/2019 10/21/2019 10/4/2018 4/5/2018 10/5/2017 4/17/2017 10/19/2016   Weight 154 lb 151 lb 6.4 oz 144 lb 144 lb 152 lb 148 lb 148 lb   BMI 25.63 kg/m2 25.19 kg/m2 23.96 kg/m2 23.96 kg/m2 25.29 kg/m2 24.63 kg/m2 24.63 kg/m2     General:  WD WN appropriately groomed  female in NAD. EYES:   POSITIVE:  none. Except for Positive findings listed, this system was WNL. My WNL exam this visit:   Conjunctivae & lids w/o erythema or discharge.  PERRL; Iris color drk brown w/o visible vessels, deposits. EARS, NOSE, MOUTH & THROAT:   POSITIVE:  Mild bogginess w/  moderate enlargement of right turbinate. Except for Positive findings listed, this system was WNL. My WNL exam this visit:  External ears & nose WNL w/o scars, lesions or masses.  Nasal mucosa, septum & turbinates WNL.  Lips WNL; Teeth & gums WNL  Oral mucosa, salivary glands, hard & soft palates, tongue, tonsils & posterior pharynx WNL.  Otoscope: External auditory canals & tympanic membranes WNL; Wax WNL. HEAD & NECK:   POSITIVE:  none.  Except for Positive findings listed, this system was WNL. My WNL exam this visit:  Supple. Atraumatic, normocephalic. Symmetrical w/o masses, tenderness, tracheal deviation, crepitus.  Thyroid w/o enlargement, tenderness, mass. RESPIRATORY:   POSITIVE:  none. Except for Positive findings listed, this system was WNL. My WNL exam this visit:  Effort WNL; no intercostal retractions or accessory muscle use; diaphragmatic movement WNL.  Breath sounds: good air entry, no adventitious sounds; no rales, wheezes, rhonchi or rubs.  No dullness, flatness or hyperresonance. CARDIOVASCULAR:  POSITIVE:  none. Except for Positive findings listed, this system was WNL. My WNL exam this visit:    Heart - w/o thrills. PMI non-displaced.  S1, S2 normal rate, regular rhythm. No murmurs, gallops, clicks or rubs. Vascular  carotid pulses WNL; no bruits  abdominal aorta size WNL; no bruits  pedal pulses WNL  Extremities negative for edema or varicosities. GASTROINTESTINAL (Abdomen):   POSITIVE:  Naval surgical scars x 2 (hernia). Except for Positive findings listed, this system was WNL. My WNL exam this visit:  Flat; NABS w/o masses or tenderness  Liver 6 cm in RMCL. Liver & spleen w/o organomegaly.  No hernias palpable. GENITOURINARY (Urinary Only):  POSITIVE:  none. Except for Positive findings listed, this system was WNL. My WNL exam this visit:    No CVA or suprapubic tenderness. LYMPHATIC ( Neck ):   POSITIVE:  none. Except for Positive findings listed, this system was WNL. My WNL exam this visit:  Lymph nodes: Palpation WNL w/o enlargement, tenderness or fixation. MUSCULOSKELETAL:   POSITIVE: Tender shoulder joints bilaterally; almost FROM w/ pain posterior shoulder extending to back w/ adduction across the chest bilaterally. Tender left medial knee. Except for Positive findings listed, this system was WNL. My WNL exam this visit:    Gait & station WNL.   Joints, Bones and Muscles of   Head & Neck:  Spine, Ribs and Pelvis:  RUE:  LUE:  RLE:  LLE:   - No misalignment, asymmetry, crepitation, defects, tenderness, masses or effusions.   - ROM full w/o pain, crepitation or contracture. - Joints stable w/o dislocation (luxation), subluxation or laxity.  - Muscle strength 5/5, tone WNL w/o flaccidity, cog wheel or spasticity. No atrophy or abnormal movements. SKIN & SUBCUTANEOUS TISSUE:  POSITIVE:  none. Except for Positive findings listed, this system was WNL. My WNL exam this visit:   No rashes, lesions, ulcers.  No induration, subcutaneous nodules, tightening. NEUROLOGIC:   POSITIVE:  none. Except for Positive findings listed, this system was WNL. My WNL exam this visit:   Cranial nerves 2-12 intact.  DTRs: Biceps, patellar, Achilles symmetrical and WNL. Babinski negative bilaterally. PSYCHIATRIC:   POSITIVE:  none. Except for Positive findings listed, this system was WNL. My WNL exam this visit:  Judgment & insight WNL.  Awake, aware, alert, appropriate; affect & mood WNL w/o evidence of depression, anxiety, agitation, anger or aggression. Oriented to person, place & time. Recent & remote memory of visit related issues WNL. PHQ   3 most recent PHQ Screens 12/30/2019   Little interest or pleasure in doing things Not at all   Feeling down, depressed, irritable, or hopeless Not at all   Total Score PHQ 2 0       HISTORY - ROS  PAST  FAMILY  SURGICAL  SOCIAL  with PROBLEM LIST:    ROS - Review of Systems    EXCEPT FOR POSITIVES LISTED, the Review of Systems below was negative or within normal limits  CONSTITUTIONAL:  Positive for: menopausal sweats; worse nighttime. Fever  chills  night sweats  fatigue  malaise  weakness  anorexia  wt loss or gain  EYES:  Positive for: Far, sighted; last exams 7/19. Vision loss, blurred, double  color blind  discharge  irritation  glasses/contacts  eye exam:___/___ /____  H&NENT:  Positive for: none.    Head trauma  deafness  tinnitus vertigo  ear discharge or pain  rhinitis  sinusitis  nasal drainage or congestion  epistaxis  sore mouth / tongue / throat  tonsillitis  voice changes  hoarseness  bad teeth or gums  RESPIRATORY:  Positive for: none. SOB  wheezing  cough  sputum  hemoptysis  asthma / COPD  pneumonia  TB/TB exposure  pleuritis  CARDIOVASCULAR:  Positivefor: too much cafeine causes palpitations. Chest pain  diaphoresis  NULL  tachycardia  palpitations  LE edema  orthopnea  PND  lightheaded  syncope  GASTROINTESTINAL:  Positive for: Constipation improving. Nausea  vomiting  constipation  diarrhea  abdominal pain  hematochezia  melena  hematemesis  dysphagia  indigestion  acid reflux/GERD  hepatitis  liver problems  jaundice  GENITOURINARY:  Positive for: none  . Frequency  dysuria  hematuria  urine odor  urethral/vaginal discharge  urgency  hesitancy  incomplete emptying  weak stream  SKINBREASTS:  Positive for: none. Rash  itching  whelps  bruises  sores  breast lumps  MUSCULOSKELETAL:  Positive for: Bilateral shoulder pain; s/p cortisone shots in both about yr ago; began 5 yrs ago. Pain left medial knee. Joint pain, stiffness, effusion, limited movement  muscle pain, weakness  back neck pain  fracture(s)  NEUROLOGICAL:  Positive for: Migraines since thirties, taking Fioricet if catches early, sometimes stronger med. Loletta Nunnery Headaches  migraines  dizzy, lightheaded  vertigo  seizure  memory loss  gait problems   HEMELYMPH:  Positive for: -. Bruise, bleed easily  bleeding gums  lymphadenopathy  ENDOCRINE:  Positive for: none. Polyuria  polydipsia  polyphagia  heat or cold intolerance  goiter  thyroid problems  PSYCHIATRIC:  Posiive for: none.   Anxiety  depression  suicidal or homicidal thoughts  mood swings    PFSSH:  Active Ambulatory Problems     Diagnosis Date Noted    HA (headache) 02/24/2010    Breast cancer of upper-outer quadrant of left female breast ([facility] 12/21/2015    S/P lumpectomy of breast 02/10/2016     Resolved Ambulatory Problems     Diagnosis Date Noted    No Resolved Ambulatory Problems     Past Medical History:   Diagnosis Date    Breast CA ([facility] 7/11/2015    Radiation therapy complication march/june 9127     Past Surgical History:   Procedure Laterality Date    HX BREAST BIOPSY      HX BREAST LUMPECTOMY Left 2/4/2016    LEFT BREAST LUMPECTOMY, LEFT NEEDLE LOCALIZATION performed by Lori Trotter MD at [id number] [place] [address]  2014 & 2000    HX HYSTERECTOMY  2006    CORNELIUS BIOPSY BREAST STEREOTACTIC Left 02/2016     Social History     Tobacco Use    Smoking status: Never Smoker    Smokeless tobacco: Never Used   Substance Use Topics    Alcohol use:  Yes     Alcohol/week: 4.0 standard drinks     Types: 3 Glasses of wine, 1 Standard drinks or equivalent per week     Family History   Problem Relation Age of Onset    Cancer Mother         breast cancer, diagnosed age 39    Hypertension Mother     Breast Cancer Mother     Hypertension Father     Cancer Maternal Aunt         breast cancer    Breast Cancer Maternal Aunt     Cancer Other         breast cancer, diagnosed age 36    Breast Cancer Cousin         maternal---age 28        RESULTS - This Visit Only (only some results were available at the time of visit)  Results for orders placed or performed in visit on 12/30/19   AMB POC LIPID PROFILE   Result Value Ref Range    Cholesterol (POC) 281     Triglycerides (POC) 90     HDL Cholesterol (POC) 90     Non-HDL Cholesterol 192     LDL Cholesterol (POC) 174 MG/DL    TChol/HDL Ratio (POC) 3.2    AMB POC GLUCOSE BLOOD, BY GLUCOSE MONITORING DEVICE   Result Value Ref Range    Glucose POC 89 mg/dL   AMB POC URINALYSIS DIP STICK MANUAL W/O MICRO   Result Value Ref Range    Color (UA POC) Yellow     Clarity (UA POC) Clear     Glucose (UA POC) Negative Negative    Bilirubin (UA POC) Negative Negative    Ketones (UA POC) Negative Negative    Specific gravity (UA POC) 1.020 1.001 - 1.035    Blood (UA POC) Trace Negative    pH (UA POC) 5.5 4.6 - 8.0    Protein (UA POC) Negative Negative    Urobilinogen (UA POC) 0.2 mg/dL 0.2 - 1    Nitrites (UA POC) Negative Negative    Leukocyte esterase (UA POC) Negative Negative   AMB POC HEMOGLOBIN A1C   Result Value Ref Range    Hemoglobin A1c (POC) 5.1 %

## 2019-12-30 NOTE — PROGRESS NOTES
1. Have you been to the ER, urgent care clinic since your last visit? Hospitalized since your last visit?no    2. Have you seen or consulted any other health care providers outside of the [address] [place] [address] since your last visit? Include any pap smears or colon screening. no    Chief Complaint   Patient presents with   [facility] Establish Care      verbal order given for needed amb poc labs.     3 most recent PHQ Screens 12/30/2019   Little interest or pleasure in doing things Not at all   Feeling down, depressed, irritable, or hopeless Not at all   Total Score PHQ 2 0

## 2020-01-14 ENCOUNTER — OFFICE VISIT (OUTPATIENT)
Dept: SURGERY | Age: 55
End: 2020-01-14

## 2020-01-14 VITALS
HEART RATE: 58 BPM | DIASTOLIC BLOOD PRESSURE: 64 MMHG | BODY MASS INDEX: 24.49 KG/M2 | HEIGHT: 65 IN | SYSTOLIC BLOOD PRESSURE: 102 MMHG | WEIGHT: 147 LBS

## 2020-01-14 DIAGNOSIS — Z17.1 MALIGNANT NEOPLASM OF UPPER-OUTER QUADRANT OF LEFT BREAST IN FEMALE, ESTROGEN RECEPTOR NEGATIVE (HCC): Primary | ICD-10-CM

## 2020-01-14 DIAGNOSIS — Z98.890 S/P LUMPECTOMY, LEFT BREAST: ICD-10-CM

## 2020-01-14 DIAGNOSIS — C50.412 MALIGNANT NEOPLASM OF UPPER-OUTER QUADRANT OF LEFT BREAST IN FEMALE, ESTROGEN RECEPTOR NEGATIVE (HCC): Primary | ICD-10-CM

## 2020-01-14 DIAGNOSIS — Z92.3 S/P RADIATION THERAPY: ICD-10-CM

## 2020-01-14 NOTE — PROGRESS NOTES
HISTORY OF PRESENT ILLNESS  Melba Adams is a 48 y.o. female. HPI   ESTABLISHED patient here for RIGHT breast mass under her breast along with some tenderness. Says that she noticed this 4-5 days ago. It has not changed at all except that it did drain some white material yesterday. Had a biopsy of a suspicious area in the LEFT breast last October; pathology was benign. Is due for her annual mammogram in October of 2020. Breast history -   Referring - Dr. 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 Followed by Dr. Justin Steward. 2/4/16- LEFT breast lumpectomy and LEFT SLNB    LEFT IDC 1.5 cm, margins clear, ER/WI negative, HER2 unamp. , 6 SLN - negative. Staging - [id number]  4/27/2016- completed radiation. Followed by Dr. Brian Pettit. 6/3/16 - port removed    FH includes-  Mother was diagnosed with breast cancer at age 39.    Maternal aunt was diagnosed with breast cancer (unsure what age). Maternal cousin was diagnosed with breast cancer at age 36.    Patient had genetic testing- BreastNEXT negative. OB History    No obstetric history on file. Obstetric Comments   Menarche:  17. LMP: ? .  # of Children:  2. Age at Delivery of First Child:  24.   Hysterectomy/oophorectomy:  YES/NO. Breast Bx:  yes. Hx of Breast Feeding:  no. BCP:  yes. Hormone therapy:  no.                  Past Surgical History:   Procedure Laterality Date    HX BREAST BIOPSY      HX BREAST LUMPECTOMY Left 2/4/2016    LEFT BREAST LUMPECTOMY, LEFT NEEDLE LOCALIZATION performed by Yancy Carson MD at [id number] [facility]  2014 & 2000    HX HYSTERECTOMY  2006    CORNELIUS BIOPSY BREAST STEREOTACTIC Left 02/2016     ROS    Physical Exam  Constitutional:       Appearance: Normal appearance. Chest:       Neurological:      Mental Status: She is alert.    Psychiatric:         Attention and Perception: Attention normal.         Mood and Affect: Mood normal.         Speech: Speech normal.         Behavior: Behavior normal.       Visit Vitals  /64   Pulse (!) 58   Ht 5' 5\" (1.651 m)   Wt 147 lb (66.7 kg)   LMP  (LMP Unknown)   BMI 24.46 kg/m²     ASSESSMENT and PLAN  Encounter Diagnoses   Name Primary?  Malignant neoplasm of upper-outer quadrant of left breast in female, estrogen receptor negative ([facility] [address] Yes    S/P lumpectomy, left breast     S/P radiation therapy      Right breast mass - likely sebaceous cyst that spontaneously drained. Discussed that sebaceous cysts are normally sterile, but can become inflamed. Discussed sx management with warm compresses and OTC pain medication PRN. Discussed recurrence of these cysts is possible and if it becomes a chronic issue, excision may be necessary. Routine follow-up for her history of breast cancer with BDmammogram 3D and RTC in 10/2020. She is comfortable with this plan. All questions answered and she stated understanding.

## 2020-02-06 LAB
ALBUMIN SERPL-MCNC: 4.2 G/DL (ref 3.8–4.9)
ALBUMIN/GLOB SERPL: 1.5 {RATIO} (ref 1.2–2.2)
ALP SERPL-CCNC: 74 IU/L (ref 39–117)
ALT SERPL-CCNC: 12 IU/L (ref 0–32)
AST SERPL-CCNC: 18 IU/L (ref 0–40)
BILIRUB SERPL-MCNC: 0.2 MG/DL (ref 0–1.2)
BUN SERPL-MCNC: 11 MG/DL (ref 6–24)
BUN/CREAT SERPL: 17 (ref 9–23)
CALCIUM SERPL-MCNC: 9.3 MG/DL (ref 8.7–10.2)
CHLORIDE SERPL-SCNC: 105 MMOL/L (ref 96–106)
CO2 SERPL-SCNC: 22 MMOL/L (ref 20–29)
CREAT SERPL-MCNC: 0.64 MG/DL (ref 0.57–1)
ERYTHROCYTE [DISTWIDTH] IN BLOOD BY AUTOMATED COUNT: 12.8 % (ref 11.7–15.4)
GLOBULIN SER CALC-MCNC: 2.8 G/DL (ref 1.5–4.5)
GLUCOSE SERPL-MCNC: 72 MG/DL (ref 65–99)
HCT VFR BLD AUTO: 40.1 % (ref 34–46.6)
HGB BLD-MCNC: 13.5 G/DL (ref 11.1–15.9)
MCH RBC QN AUTO: 30.4 PG (ref 26.6–33)
MCHC RBC AUTO-ENTMCNC: 33.7 G/DL (ref 31.5–35.7)
MCV RBC AUTO: 90 FL (ref 79–97)
PLATELET # BLD AUTO: 220 X10E3/UL (ref 150–450)
POTASSIUM SERPL-SCNC: 4.5 MMOL/L (ref 3.5–5.2)
PROT SERPL-MCNC: 7 G/DL (ref 6–8.5)
RBC # BLD AUTO: 4.44 X10E6/UL (ref 3.77–5.28)
SODIUM SERPL-SCNC: 146 MMOL/L (ref 134–144)
WBC # BLD AUTO: 4.4 X10E3/UL (ref 3.4–10.8)

## 2020-03-12 DIAGNOSIS — M25.512 CHRONIC PAIN OF BOTH SHOULDERS: ICD-10-CM

## 2020-03-12 DIAGNOSIS — M25.511 CHRONIC PAIN OF BOTH SHOULDERS: ICD-10-CM

## 2020-03-12 DIAGNOSIS — G89.29 CHRONIC PAIN OF BOTH SHOULDERS: ICD-10-CM

## 2020-03-13 RX ORDER — CYCLOBENZAPRINE HCL 5 MG
TABLET ORAL
Qty: 90 TAB | Refills: 1 | Status: SHIPPED | OUTPATIENT
Start: 2020-03-13

## 2020-10-20 ENCOUNTER — HOSPITAL ENCOUNTER (OUTPATIENT)
Dept: MAMMOGRAPHY | Age: 55
Discharge: HOME | End: 2020-10-20
Attending: NURSE PRACTITIONER
Payer: MEDICAID

## 2020-10-20 DIAGNOSIS — Z98.890 S/P LUMPECTOMY, LEFT BREAST: ICD-10-CM

## 2020-10-20 DIAGNOSIS — C50.412 MALIGNANT NEOPLASM OF UPPER-OUTER QUADRANT OF LEFT BREAST IN FEMALE, ESTROGEN RECEPTOR NEGATIVE (HCC): ICD-10-CM

## 2020-10-20 DIAGNOSIS — Z17.1 MALIGNANT NEOPLASM OF UPPER-OUTER QUADRANT OF LEFT BREAST IN FEMALE, ESTROGEN RECEPTOR NEGATIVE (HCC): ICD-10-CM

## 2020-10-20 DIAGNOSIS — Z92.3 S/P RADIATION THERAPY: ICD-10-CM

## 2020-10-20 PROCEDURE — 77062 BREAST TOMOSYNTHESIS BI: CPT

## 2021-09-22 ENCOUNTER — TRANSCRIBE ORDER (OUTPATIENT)
Dept: SCHEDULING | Age: 56
End: 2021-09-22

## 2021-09-22 DIAGNOSIS — Z12.31 VISIT FOR SCREENING MAMMOGRAM: Primary | ICD-10-CM

## 2021-10-29 ENCOUNTER — HOSPITAL ENCOUNTER (OUTPATIENT)
Dept: MAMMOGRAPHY | Age: 56
Discharge: HOME | End: 2021-10-29
Attending: SURGERY
Payer: COMMERCIAL

## 2021-10-29 DIAGNOSIS — Z12.31 VISIT FOR SCREENING MAMMOGRAM: ICD-10-CM

## 2021-10-29 PROCEDURE — 77063 BREAST TOMOSYNTHESIS BI: CPT

## 2022-10-31 ENCOUNTER — TRANSCRIBE ORDER (OUTPATIENT)
Dept: REGISTRATION | Age: 57
End: 2022-10-31

## 2022-10-31 ENCOUNTER — HOSPITAL ENCOUNTER (OUTPATIENT)
Dept: MAMMOGRAPHY | Age: 57
Discharge: HOME | End: 2022-10-31
Attending: FAMILY MEDICINE
Payer: COMMERCIAL

## 2022-10-31 DIAGNOSIS — Z12.31 OTHER SCREENING MAMMOGRAM: ICD-10-CM

## 2022-10-31 DIAGNOSIS — Z12.31 OTHER SCREENING MAMMOGRAM: Primary | ICD-10-CM

## 2022-10-31 PROCEDURE — 77067 SCR MAMMO BI INCL CAD: CPT
